# Patient Record
Sex: FEMALE | Race: WHITE | NOT HISPANIC OR LATINO | Employment: FULL TIME | URBAN - METROPOLITAN AREA
[De-identification: names, ages, dates, MRNs, and addresses within clinical notes are randomized per-mention and may not be internally consistent; named-entity substitution may affect disease eponyms.]

---

## 2022-11-29 DIAGNOSIS — K21.9 GASTROESOPHAGEAL REFLUX DISEASE: Primary | ICD-10-CM

## 2022-11-29 DIAGNOSIS — R10.9 ABDOMINAL PAIN: ICD-10-CM

## 2022-12-07 ENCOUNTER — HOSPITAL ENCOUNTER (OUTPATIENT)
Dept: RADIOLOGY | Facility: HOSPITAL | Age: 57
Discharge: HOME | End: 2022-12-07
Attending: OBSTETRICS & GYNECOLOGY
Payer: COMMERCIAL

## 2022-12-07 DIAGNOSIS — K21.9 GASTROESOPHAGEAL REFLUX DISEASE: ICD-10-CM

## 2022-12-07 DIAGNOSIS — R10.9 ABDOMINAL PAIN: ICD-10-CM

## 2022-12-07 PROCEDURE — 76700 US EXAM ABDOM COMPLETE: CPT | Mod: TC

## 2022-12-07 PROCEDURE — A9537 TC99M MEBROFENIN: HCPCS

## 2022-12-07 PROCEDURE — 78226 HEPATOBILIARY SYSTEM IMAGING: CPT | Mod: TC

## 2023-03-15 ENCOUNTER — HOSPITAL ENCOUNTER (EMERGENCY)
Facility: HOSPITAL | Age: 58
Discharge: HOME | End: 2023-03-15
Attending: INTERNAL MEDICINE
Payer: COMMERCIAL

## 2023-03-15 VITALS
HEIGHT: 61 IN | WEIGHT: 151.88 LBS | TEMPERATURE: 98 F | BODY MASS INDEX: 28.67 KG/M2 | OXYGEN SATURATION: 99 % | SYSTOLIC BLOOD PRESSURE: 134 MMHG | RESPIRATION RATE: 16 BRPM | HEART RATE: 85 BPM | DIASTOLIC BLOOD PRESSURE: 86 MMHG

## 2023-03-15 DIAGNOSIS — R42 DIZZINESS: Primary | ICD-10-CM

## 2023-03-15 DIAGNOSIS — R07.9 CHEST PAIN: ICD-10-CM

## 2023-03-15 PROBLEM — I34.1 MITRAL VALVE PROLAPSE: Status: ACTIVE | Noted: 2020-01-23

## 2023-03-15 PROBLEM — I25.2 OLD MYOCARDIAL INFARCTION OF INFERIOR WALL, GREATER THAN 8 WEEKS: Status: ACTIVE | Noted: 2020-01-23

## 2023-03-15 PROBLEM — Z95.5 STENTED CORONARY ARTERY: Status: ACTIVE | Noted: 2020-01-23

## 2023-03-15 LAB
ALBUMIN SERPL-MCNC: 3.6 G/DL (ref 3.5–5)
ALBUMIN/GLOB SERPL: 1.2 RATIO (ref 1.1–2)
ALP SERPL-CCNC: 67 UNIT/L (ref 40–150)
ALT SERPL-CCNC: 15 UNIT/L (ref 0–55)
AST SERPL-CCNC: 12 UNIT/L (ref 5–34)
BASOPHILS # BLD AUTO: 0.02 X10(3)/MCL (ref 0–0.2)
BASOPHILS NFR BLD AUTO: 0.5 %
BILIRUBIN DIRECT+TOT PNL SERPL-MCNC: 0.4 MG/DL
BNP BLD-MCNC: 16.8 PG/ML
BUN SERPL-MCNC: 13 MG/DL (ref 9.8–20.1)
CALCIUM SERPL-MCNC: 9.3 MG/DL (ref 8.4–10.2)
CHLORIDE SERPL-SCNC: 109 MMOL/L (ref 98–107)
CO2 SERPL-SCNC: 26 MMOL/L (ref 22–29)
CREAT SERPL-MCNC: 0.64 MG/DL (ref 0.55–1.02)
EOSINOPHIL # BLD AUTO: 0.14 X10(3)/MCL (ref 0–0.9)
EOSINOPHIL NFR BLD AUTO: 3.2 %
ERYTHROCYTE [DISTWIDTH] IN BLOOD BY AUTOMATED COUNT: 12.5 % (ref 11.5–17)
GFR SERPLBLD CREATININE-BSD FMLA CKD-EPI: >60 MLS/MIN/1.73/M2
GLOBULIN SER-MCNC: 2.9 GM/DL (ref 2.4–3.5)
GLUCOSE SERPL-MCNC: 114 MG/DL (ref 74–100)
HCT VFR BLD AUTO: 40 % (ref 37–47)
HGB BLD-MCNC: 12.4 G/DL (ref 12–16)
IMM GRANULOCYTES # BLD AUTO: 0.01 X10(3)/MCL (ref 0–0.04)
IMM GRANULOCYTES NFR BLD AUTO: 0.2 %
LYMPHOCYTES # BLD AUTO: 1.04 X10(3)/MCL (ref 0.6–4.6)
LYMPHOCYTES NFR BLD AUTO: 24.1 %
MCH RBC QN AUTO: 29.7 PG
MCHC RBC AUTO-ENTMCNC: 31 G/DL (ref 33–36)
MCV RBC AUTO: 95.7 FL (ref 80–94)
MONOCYTES # BLD AUTO: 0.32 X10(3)/MCL (ref 0.1–1.3)
MONOCYTES NFR BLD AUTO: 7.4 %
NEUTROPHILS # BLD AUTO: 2.78 X10(3)/MCL (ref 2.1–9.2)
NEUTROPHILS NFR BLD AUTO: 64.6 %
PLATELET # BLD AUTO: 251 X10(3)/MCL (ref 130–400)
PMV BLD AUTO: 10 FL (ref 7.4–10.4)
POTASSIUM SERPL-SCNC: 4.8 MMOL/L (ref 3.5–5.1)
PROT SERPL-MCNC: 6.5 GM/DL (ref 6.4–8.3)
RBC # BLD AUTO: 4.18 X10(6)/MCL (ref 4.2–5.4)
SODIUM SERPL-SCNC: 140 MMOL/L (ref 136–145)
TROPONIN I SERPL-MCNC: 0.01 NG/ML (ref 0–0.04)
WBC # SPEC AUTO: 4.3 X10(3)/MCL (ref 4.5–11.5)

## 2023-03-15 PROCEDURE — 93010 ELECTROCARDIOGRAM REPORT: CPT | Mod: ,,, | Performed by: INTERNAL MEDICINE

## 2023-03-15 PROCEDURE — 83880 ASSAY OF NATRIURETIC PEPTIDE: CPT | Performed by: INTERNAL MEDICINE

## 2023-03-15 PROCEDURE — 99285 EMERGENCY DEPT VISIT HI MDM: CPT | Mod: 25

## 2023-03-15 PROCEDURE — 93010 EKG 12-LEAD: ICD-10-PCS | Mod: ,,, | Performed by: INTERNAL MEDICINE

## 2023-03-15 PROCEDURE — 93005 ELECTROCARDIOGRAM TRACING: CPT

## 2023-03-15 PROCEDURE — 84484 ASSAY OF TROPONIN QUANT: CPT | Performed by: INTERNAL MEDICINE

## 2023-03-15 PROCEDURE — 80053 COMPREHEN METABOLIC PANEL: CPT | Performed by: INTERNAL MEDICINE

## 2023-03-15 PROCEDURE — 85025 COMPLETE CBC W/AUTO DIFF WBC: CPT | Performed by: INTERNAL MEDICINE

## 2023-03-15 RX ORDER — ASPIRIN 81 MG/1
81 TABLET ORAL DAILY
Qty: 90 TABLET | Refills: 3 | Status: SHIPPED | OUTPATIENT
Start: 2023-03-15 | End: 2024-03-14

## 2023-03-15 RX ORDER — MECLIZINE HYDROCHLORIDE 25 MG/1
25 TABLET ORAL 3 TIMES DAILY PRN
Qty: 20 TABLET | Refills: 0 | Status: SHIPPED | OUTPATIENT
Start: 2023-03-15

## 2023-03-15 NOTE — DISCHARGE INSTRUCTIONS
See a neurologist for followup and further care.    If you already have a neurologist followup with him and let him adjust meds or change as needed.    If you do not have one, please get your family doc to refer you to one. or may be you can call the nubmers provided below.    [facility] [address] [place] [address]    [phone]          Take medicines as prescribed    See your family doctor in one to 2 days for further evaluation, workup, and treatment as necessary    Avoid driving or operating machinery while taking medicines as some medicines might cause drowsiness and may cause problems. Also pain medicines have potential of being addictive  so use Pain meds specially Narcotics Sparingly.    The exam and treatment you received in Emergency Room was for an urgent problem and NOT INTENDED AS COMPLETE CARE. It is important that you FOLLOW UP with a doctor for ongoing care. If your symptoms become WORSE or you DO NOT IMPROVE and you are unable to reach your health care provider, you should RETURN to the emergency department. The Emergency Room doctor has provided a PRELIMINARY INTERPRETATION of all your STUDIES. A final interpretation may be done after you are discharged. IF A CHANGE in your diagnosis or treatment is needed WE WILL CONTACT YOU. It is critical that we have a CURRENT PHONE NUMBER FOR YOU.

## 2023-03-15 NOTE — ED PROVIDER NOTES
03/15/2023         7:29 AM    Source of History:  History obtained from the patient.     Chief complaint:  From Nurse Triage:  Dizziness (C/o dizzy episode yesterday while working out. States woke up this morning with dizziness again. Recently had sinus infection)    HISTORY OF PRESENT ILLNES:  Denise Carter is a 57 y.o. female presenting with Dizziness (C/o dizzy episode yesterday while working out. States woke up this morning with dizziness again. Recently had sinus infection)       Patient had a dizzy spell when she was working out yesterday at the gym, and then she is kept on feeling dizzy like feeling off balance and this morning she continues to have the symptoms so she decided to come to the emergency room.  She says she had a sinus infection which made her feel like this also, she has history of coronary artery disease, had a stent done in 2002, coronary angiogram was negative in 2022    REVIEW OF SYSTEMS:   Constitutional symptoms:  No Fever. No Chills    Skin symptoms:  No Rash.    Eye symptoms:  No Visual disturbance reported.   ENMT symptoms:  No Sore throat,    Respiratory symptoms:  No Shortness of Breath, no Cough, no Wheezing.    Cardiovascular symptoms:  No Chest Pain, No Palpitations, No Tachycardia.    Gastrointestinal symptoms:  No Abdominal Pain, No Nausea, No Vomiting, No Diarrhea, No Constipation.    Genitourinary symptoms:  No Dysuria,    Musculoskeletal symptoms:  No Back pain,    Neurologic symptoms:  No Headache, No Dizziness.    Psychiatric symptoms:  No Anxiety, No Depression, No Substance Abuse.              Additional review of systems information: Patient Denies Any Other Complaints.    All Other Systems Reviewed With Patient And Negative.    ALLEGIES:  Review of patient's allergies indicates:   Allergen Reactions    Codeine        MEDICINE LIST:  Current Outpatient Medications   Medication Instructions    meclizine (ANTIVERT) 25 mg, Oral, 3 times daily PRN        PMH:  As per HPI  and below:    Reviewed and updated in chart.    PAST MEDICAL HISTORY:  Past Medical History:   Diagnosis Date    Anxiety disorder, unspecified     Coronary artery disease     Depression         PAST SURGICAL HISTORY:  Past Surgical History:   Procedure Laterality Date     SECTION      PLACEMENT-STENT         SOCIAL HISTORY:  Social History     Tobacco Use    Smoking status: Never    Smokeless tobacco: Never   Substance Use Topics    Alcohol use: Not Currently    Drug use: Never       FAMILY HISTORY:  History reviewed. No pertinent family history.     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  General:  Alert, No Cardiorespiratory Distress Noted.   Skin: Normal For Ethnic Origin  Eye:  Extraocular Movements Are Intact.   ENT: Mucus membranes are moist.   Cardiovascular:  Regular Rate And Rhythm, No Murmur, No Pedal Edema.    Respiratory:  Respirations Nonlabored, No Respiratory Distress, Good Bilateral Air Entry, No Rales, No Rhonchi.    Musculoskeletal:  No Gross Deformity Noted.   Gastrointestinal:  Soft, Non Distended, Non Tenderness, Normal Bowel Sounds.    Neurological:  Alert And Oriented To Person, Place, Time, And Situation, Normal Motor Observed, Normal Speech Observed.    Psychiatric:  Cooperative, Appropriate Mood & Affect.      ED WORKUP FOR MEDICAL DECISION MAKING:    ED ORDERS:  Orders Placed This Encounter   Procedures    X-ray Chest AP Portable    CT Head Without Contrast    Comprehensive metabolic panel    CBC auto differential    Troponin I    Brain natriuretic peptide    CBC with Differential    Diet NPO    Vital signs    Cardiac Monitoring - Adult    Oxygen Continuous    Pulse Oximetry  Continuous    EKG 12-LEAD    Insert saline lock       ED MEDICINES:  Medications - No data to display         ECG Results              EKG 12-LEAD (Preliminary result)  Result time 03/15/23 08:49:17      Wet Read by Michelle Wolfe MD (03/15/23 08:49:17, [facility] - Emergency Dept, Emergency Medicine)    EKG Initial Reading: Independently Interpreted by Michelle Wolfe MD. independently as: No STEMI. Rhythm: Normal Sinus Rhythm, Rate 79. Ectopy: No Ectopy. Conduction: Normal. ST Segments: Normal ST Segments. T Waves: Normal. Axis: Normal. Clinical Impression: Normal Sinus Rhythm Other Impression: Normal EKG                         In process by Interface, Lab In [facility] (03/15/23 08:42:42)                   Narrative:    Test Reason : R07.9,    Vent. Rate : 079 BPM     Atrial Rate : 079 BPM     P-R Int : 164 ms          QRS Dur : 076 ms      QT Int : 388 ms       P-R-T Axes : 051 -06 030 degrees     QTc Int : 444 ms    Normal sinus rhythm  Normal ECG  No previous ECGs available    Referred By: [email or web address]   SELF           Confirmed By:                                     ED LABS ORDERED AND REVIEWED:  Admission on 03/15/2023   Component Date Value Ref Range Status    Sodium Level 03/15/2023 140  136 - 145 mmol/L Final    Potassium Level 03/15/2023 4.8  3.5 - 5.1 mmol/L Final    Chloride 03/15/2023 109 (H)  98 - 107 mmol/L Final    Carbon Dioxide 03/15/2023 26  22 - 29 mmol/L Final    Glucose Level 03/15/2023 114 (H)  74 - 100 mg/dL Final    Blood Urea Nitrogen 03/15/2023 13.0  9.8 - 20.1 mg/dL Final    Creatinine 03/15/2023 0.64  0.55 - 1.02 mg/dL Final    Calcium Level Total 03/15/2023 9.3  8.4 - 10.2 mg/dL Final    Protein Total 03/15/2023 6.5  6.4 - 8.3 gm/dL Final    Albumin Level 03/15/2023 3.6  3.5 - 5.0 g/dL Final    Globulin 03/15/2023 2.9  2.4 - 3.5 gm/dL Final    Albumin/Globulin Ratio 03/15/2023 1.2  1.1 - 2.0 ratio Final    Bilirubin Total 03/15/2023 0.4  <=1.5 mg/dL Final    Alkaline  Phosphatase 03/15/2023 67  40 - 150 unit/L Final    Alanine Aminotransferase 03/15/2023 15  0 - 55 unit/L Final    Aspartate Aminotransferase 03/15/2023 12  5 - 34 unit/L Final    eGFR 03/15/2023 >60  mls/min/1.73/m2 Final    Troponin-I 03/15/2023 0.011  0.000 - 0.045 ng/mL Final    Natriuretic Peptide 03/15/2023 16.8  <=100.0 pg/mL Final    WBC 03/15/2023 4.3 (L)  4.5 - 11.5 x10(3)/mcL Final    RBC 03/15/2023 4.18 (L)  4.20 - 5.40 x10(6)/mcL Final    Hgb 03/15/2023 12.4  12.0 - 16.0 g/dL Final    Hct 03/15/2023 40.0  37.0 - 47.0 % Final    MCV 03/15/2023 95.7 (H)  80.0 - 94.0 fL Final    MCH 03/15/2023 29.7  pg Final    MCHC 03/15/2023 31.0 (L)  33.0 - 36.0 g/dL Final    RDW 03/15/2023 12.5  11.5 - 17.0 % Final    Platelet 03/15/2023 251  130 - 400 x10(3)/mcL Final    MPV 03/15/2023 10.0  7.4 - 10.4 fL Final    Neut % 03/15/2023 64.6  % Final    Lymph % 03/15/2023 24.1  % Final    Mono % 03/15/2023 7.4  % Final    Eos % 03/15/2023 3.2  % Final    Basophil % 03/15/2023 0.5  % Final    Lymph # 03/15/2023 1.04  0.6 - 4.6 x10(3)/mcL Final    Neut # 03/15/2023 2.78  2.1 - 9.2 x10(3)/mcL Final    Mono # 03/15/2023 0.32  0.1 - 1.3 x10(3)/mcL Final    Eos # 03/15/2023 0.14  0 - 0.9 x10(3)/mcL Final    Baso # 03/15/2023 0.02  0 - 0.2 x10(3)/mcL Final    IG# 03/15/2023 0.01  0 - 0.04 x10(3)/mcL Final    IG% 03/15/2023 0.2  % Final       RADIOLOGY STUDIES ORDERED AND REVIEWED:  Imaging Results              X-ray Chest AP Portable (Final result)  Result time 03/15/23 09:41:49      Final result by Jayme Roberts MD (03/15/23 09:41:49)                   Impression:      1. No active cardiopulmonary disease identified      Electronically signed by: Jayme Roberts  Date:    03/15/2023  Time:    09:41               Narrative:    EXAMINATION:  XR CHEST AP PORTABLE    CLINICAL HISTORY:  Dizziness;, .    COMPARISON:  None available    FINDINGS:  An AP view or more reveals the heart to be normal in size.  The trachea is just right of  midline.  No definite infiltrate or effusion is seen.  Stabilization hardware is evident at the left the thoracolumbar spine.  There is curvature of the thoracolumbar spine with the convexity to the right.  Bony structures are osteopenic.                                       CT Head Without Contrast (Final result)  Result time 03/15/23 09:41:06      Final result by Jayme Roberts MD (03/15/23 09:41:06)                   Impression:      1. No acute intracranial abnormality identified  2. Mild generalized cerebral and cerebellar atrophy  3. Findings compatible with a small old lacunar infarct at the anterior left basal ganglia  4. Intracranial atherosclerosis      Electronically signed by: Jayme Roberts  Date:    03/15/2023  Time:    09:41               Narrative:    EXAMINATION:  CT HEAD WITHOUT CONTRAST    CLINICAL HISTORY:  Dizziness, persistent/recurrent, cardiac or vascular cause suspected;, .    TECHNIQUE:  PATIENT RADIATION DOSE: DLP(mGycm) 837    As per PQRS measures, all CT scans at this facility used dose modulation, iterative reconstruction, and/or weight based dose adjustment when appropriate to reduce radiation dose to as low as reasonably achievable.    COMPARISON:  None available.    FINDINGS:  Serial axial images were obtained of the head without the administration of IV contrast. Both brain and bone parenchymal windows were obtained.  Additional coronal and sagittal reconstructions were obtained.  Ventricles, cisterns, and sulci are mildly prominent size.  There is no evidence of intracranial hemorrhage, midline shift, mass effect, or abnormal extra-axial fluid collections.  A small focal parenchymal defect is evident at the anterior left putamen compatible with old lacunar infarct.  Mild scattered hypodensities are seen within the periventricular white matter.  Intracranial atherosclerosis is identified.  Cerebellar tonsils extend caudally to the level of foramen magnum.  There is a small  air-fluid level at the posterior right sphenoid sinus.  There is mild mucosal thickening at the posterior left maxillary sinus.  Mastoid air cells are aerated bilaterally.  External auditory canals are grossly patent.                                      MEDICAL DECISION MAKING:      Reviewed Nurses Note. Reviewed Vital Signs.     Reviewed Pertinent old records, History and updated as necessary.    Medical Decision Making    Denise Carter 57 y.o. female with  has a past medical history of Anxiety disorder, unspecified, Coronary artery disease, and Depression. Comes to ER with c/o Dizziness (C/o dizzy episode yesterday while working out. States woke up this morning with dizziness again. Recently had sinus infection)       Patient had a dizzy spell when she was working out yesterday at the gym, and then she is kept on feeling dizzy like feeling off balance and this morning she continues to have the symptoms so she decided to come to the emergency room.  She says she had a sinus infection which made her feel like this also, she has history of coronary artery disease, had a stent done in 2002, coronary angiogram was negative in 2022    Amount and/or Complexity of Data Reviewed  Labs: ordered. Decision-making details documented in ED Course.  Radiology: ordered. Decision-making details documented in ED Course.  ECG/medicine tests: ordered and independent interpretation performed. Decision-making details documented in ED Course.        Vitals:    03/15/23 0900   BP: 133/85   Pulse: 84   Resp: 17   Temp:        ED Course as of 03/15/23 1030   Wed Mar 15, 2023   1001 Patient's workup in the emergency room does not reveal any acute abnormality, she is been having symptoms for more than 18 hours now, [GQ]   1030 She is not having any symptoms anymore at this time, I will put her on meclizine for vertigo and I have advised her that she must go and see a neurologist for re-evaluation and further workup if needed, and she can  also see her ENT for follow-up.    She does have an old lacunar infarct in the left basal ganglia, but no other acute abnormality.  Will let her go home. [GQ]      ED Course User Index  [GQ] Michelle Wolfe MD          PROCEDURES PERFORMED IN ED:  Procedures    DIAGNOSTIC IMPRESSION:        ICD-10-CM ICD-9-CM   1. Dizziness  R42 780.4   2. Chest pain  R07.9 786.50         ED Disposition Condition    Discharge Stable               Medication List        START taking these medications      meclizine 25 mg tablet  Commonly known as: ANTIVERT  Take 1 tablet (25 mg total) by mouth 3 (three) times daily as needed.               Where to Get Your Medications        These medications were sent to [facility] [address]  [address] [place] [address]      Phone: [phone]   meclizine 25 mg tablet           Follow-up Information       Arely Garcia NP In 2 days.    Specialty: Internal Medicine  Contact information:  [address]  [address]  [place] [address]  [phone]               Neurology.                              ED Prescriptions       Medication Sig Dispense Start Date End Date Auth. Provider    meclizine (ANTIVERT) 25 mg tablet Take 1 tablet (25 mg total) by mouth 3 (three) times daily as needed. 20 tablet 3/15/2023 -- Michelle Wolfe MD          Follow-up Information       Follow up With Specialties Details Why Contact Info    Arely Garcia NP Internal Medicine In 2 days  [address]  [address]  [place] [address]  [phone]      Neurology                   Michelle Wolfe MD  03/15/23 1031

## 2023-12-26 ENCOUNTER — ON-DEMAND VIRTUAL (OUTPATIENT)
Dept: URGENT CARE | Facility: CLINIC | Age: 58
End: 2023-12-26
Payer: COMMERCIAL

## 2023-12-26 DIAGNOSIS — R30.0 DYSURIA: Primary | ICD-10-CM

## 2023-12-26 PROCEDURE — 99202 PR OFFICE/OUTPT VISIT, NEW, LEVL II, 15-29 MIN: ICD-10-PCS | Mod: 95,,, | Performed by: NURSE PRACTITIONER

## 2023-12-26 PROCEDURE — 99202 OFFICE O/P NEW SF 15 MIN: CPT | Mod: 95,,, | Performed by: NURSE PRACTITIONER

## 2023-12-26 RX ORDER — CLORAZEPATE DIPOTASSIUM 3.75 MG/1
3.75 TABLET ORAL
COMMUNITY
Start: 2023-12-14

## 2023-12-26 RX ORDER — DESVENLAFAXINE SUCCINATE 50 MG/1
50 TABLET, EXTENDED RELEASE ORAL
COMMUNITY
Start: 2023-12-14

## 2023-12-26 RX ORDER — NITROFURANTOIN (MACROCRYSTALS) 100 MG/1
100 CAPSULE ORAL 2 TIMES DAILY
Qty: 14 CAPSULE | Refills: 0 | Status: SHIPPED | OUTPATIENT
Start: 2023-12-26 | End: 2024-01-02

## 2023-12-26 RX ORDER — METOPROLOL SUCCINATE 25 MG/1
25 TABLET, EXTENDED RELEASE ORAL
COMMUNITY
Start: 2023-12-14

## 2023-12-26 NOTE — PROGRESS NOTES
Subjective:      Patient ID: Denise Carter is a 58 y.o. female.    Vitals:  vitals were not taken for this visit.     Chief Complaint: Urinary Tract Infection      Visit Type: TELE AUDIOVISUAL    Present with the patient at the time of consultation: TELEMED PRESENT WITH PATIENT: None    Past Medical History:   Diagnosis Date    Anxiety disorder, unspecified     Coronary artery disease     Depression      Past Surgical History:   Procedure Laterality Date     SECTION      PLACEMENT-STENT       Review of patient's allergies indicates:   Allergen Reactions    Atorvastatin     Codeine     Promethazine      Current Outpatient Medications on File Prior to Visit   Medication Sig Dispense Refill    clorazepate (TRANXENE) 3.75 MG Tab Take 3.75 mg by mouth.      desvenlafaxine succinate (PRISTIQ) 50 MG Tb24 Take 50 mg by mouth.      metoprolol succinate (TOPROL-XL) 25 MG 24 hr tablet Take 25 mg by mouth.      aspirin (ECOTRIN) 81 MG EC tablet Take 1 tablet (81 mg total) by mouth once daily. 90 tablet 3    meclizine (ANTIVERT) 25 mg tablet Take 1 tablet (25 mg total) by mouth 3 (three) times daily as needed. 20 tablet 0     No current facility-administered medications on file prior to visit.     History reviewed. No pertinent family history.    Medications Ordered                [facility] [address] [place], [place] [address] [address]   [address] [address] [address]    Telephone: [phone]   Fax: [phone]   Hours: Not open 24 hours                         E-Prescribed (1 of 1)              nitrofurantoin (MACRODANTIN) 100 MG capsule    Sig: Take 1 capsule (100 mg total) by mouth 2 (two) times daily. for 7 days       Start: 23     Quantity: 14 capsule Refills: 0                           Ohs Peq Odvv Intake    2023  8:17 AM CST - Filed by Patient   Describe your reason for todays visit Bladder infection   What is your current physical address in the event of a medical  emergency? [place]   Are you able to take your vital signs? No   Please attach any relevant images or files          3 days of symptoms. Reports frequency, urgency and dysuria. + hematuria. No f/c/n/v. No vaginal discharge. No severe back, flank or pelvic pain. No relief with AZO.       Urinary Tract Infection   Associated symptoms include frequency, hematuria and urgency. Pertinent negatives include no chills, flank pain, nausea or vomiting.       Constitution: Negative for chills and fever.   Gastrointestinal:  Negative for nausea and vomiting.   Genitourinary:  Positive for dysuria, frequency, urgency and hematuria. Negative for flank pain, vaginal pain, vaginal discharge, vaginal odor and pelvic pain.   Musculoskeletal:  Negative for back pain.   Allergic/Immunologic: Positive for itching (vaginal).        Objective:   The physical exam was conducted virtually.  Physical Exam   Constitutional: She is oriented to person, place, and time. She does not appear ill. No distress.   HENT:   Head: Normocephalic and atraumatic.   Nose: Nose normal.   Eyes: Extraocular movement intact   Pulmonary/Chest: Effort normal.   Abdominal: Normal appearance.   Musculoskeletal: Normal range of motion.         General: Normal range of motion.   Neurological: no focal deficit. She is alert and oriented to person, place, and time.   Psychiatric: Her behavior is normal. Mood normal.   Vitals reviewed.      Assessment:     1. Dysuria        Plan:   Patient encouraged to monitor symptoms closely and instructed to follow-up for new or worsening symptoms. Further, in-person, evaluation may be necessary for continued treatment. Please follow up with your primary care doctor or specialist as needed. Verbally discussed plan. Patient confirms understanding and is in agreement with treatment and plan.     You must understand that you've received a [facility] Care evaluation only and that you may be released before all your medical  problems are known or treated. You, the patient, will arrange for follow up care as instructed.      Dysuria  -     nitrofurantoin (MACRODANTIN) 100 MG capsule; Take 1 capsule (100 mg total) by mouth 2 (two) times daily. for 7 days  Dispense: 14 capsule; Refill: 0